# Patient Record
Sex: MALE | Race: OTHER | Employment: OTHER | URBAN - METROPOLITAN AREA
[De-identification: names, ages, dates, MRNs, and addresses within clinical notes are randomized per-mention and may not be internally consistent; named-entity substitution may affect disease eponyms.]

---

## 2019-04-07 ENCOUNTER — HOSPITAL ENCOUNTER (INPATIENT)
Facility: HOSPITAL | Age: 84
LOS: 1 days | Discharge: HOME | DRG: 202 | End: 2019-04-09
Attending: EMERGENCY MEDICINE | Admitting: HOSPITALIST
Payer: COMMERCIAL

## 2019-04-07 ENCOUNTER — APPOINTMENT (OUTPATIENT)
Dept: GENERAL RADIOLOGY | Facility: HOSPITAL | Age: 84
DRG: 202 | End: 2019-04-07
Attending: EMERGENCY MEDICINE
Payer: COMMERCIAL

## 2019-04-07 DIAGNOSIS — E86.0 DEHYDRATION: Primary | ICD-10-CM

## 2019-04-07 DIAGNOSIS — R19.7 DIARRHEA OF PRESUMED INFECTIOUS ORIGIN: ICD-10-CM

## 2019-04-07 DIAGNOSIS — B34.9 VIRAL SYNDROME: ICD-10-CM

## 2019-04-07 PROBLEM — R73.9 HYPERGLYCEMIA: Status: ACTIVE | Noted: 2019-04-07

## 2019-04-07 PROCEDURE — 99223 1ST HOSP IP/OBS HIGH 75: CPT | Performed by: HOSPITALIST

## 2019-04-07 PROCEDURE — 71046 X-RAY EXAM CHEST 2 VIEWS: CPT | Performed by: EMERGENCY MEDICINE

## 2019-04-07 RX ORDER — PANTOPRAZOLE SODIUM 20 MG/1
20 TABLET, DELAYED RELEASE ORAL
Status: DISCONTINUED | OUTPATIENT
Start: 2019-04-08 | End: 2019-04-09

## 2019-04-07 RX ORDER — DEXTROSE MONOHYDRATE 25 G/50ML
50 INJECTION, SOLUTION INTRAVENOUS
Status: DISCONTINUED | OUTPATIENT
Start: 2019-04-07 | End: 2019-04-09

## 2019-04-07 RX ORDER — BICALUTAMIDE 50 MG/1
50 TABLET, FILM COATED ORAL 3 TIMES DAILY
COMMUNITY

## 2019-04-07 RX ORDER — ALENDRONATE SODIUM 70 MG/1
70 TABLET ORAL WEEKLY
COMMUNITY

## 2019-04-07 RX ORDER — OMEPRAZOLE 20 MG/1
20 CAPSULE, DELAYED RELEASE ORAL
COMMUNITY

## 2019-04-07 RX ORDER — ACETAMINOPHEN 325 MG/1
650 TABLET ORAL EVERY 6 HOURS PRN
Status: DISCONTINUED | OUTPATIENT
Start: 2019-04-07 | End: 2019-04-09

## 2019-04-07 RX ORDER — DEXTROSE AND SODIUM CHLORIDE 5; .45 G/100ML; G/100ML
INJECTION, SOLUTION INTRAVENOUS CONTINUOUS
Status: ACTIVE | OUTPATIENT
Start: 2019-04-07 | End: 2019-04-07

## 2019-04-07 RX ORDER — BICALUTAMIDE 50 MG/1
50 TABLET, FILM COATED ORAL 3 TIMES DAILY
Status: DISCONTINUED | OUTPATIENT
Start: 2019-04-07 | End: 2019-04-09

## 2019-04-07 RX ORDER — GUAIFENESIN 600 MG
600 TABLET, EXTENDED RELEASE 12 HR ORAL 2 TIMES DAILY
Status: DISCONTINUED | OUTPATIENT
Start: 2019-04-07 | End: 2019-04-09

## 2019-04-07 RX ORDER — IBANDRONATE SODIUM 3 MG/3 ML
3 SYRINGE (ML) INTRAVENOUS ONCE
COMMUNITY

## 2019-04-07 RX ORDER — SODIUM CHLORIDE 9 MG/ML
INJECTION, SOLUTION INTRAVENOUS ONCE
Status: COMPLETED | OUTPATIENT
Start: 2019-04-07 | End: 2019-04-07

## 2019-04-07 RX ORDER — ALBUTEROL SULFATE 2.5 MG/3ML
2.5 SOLUTION RESPIRATORY (INHALATION) EVERY 4 HOURS PRN
Status: DISCONTINUED | OUTPATIENT
Start: 2019-04-07 | End: 2019-04-09

## 2019-04-07 RX ORDER — ONDANSETRON 2 MG/ML
4 INJECTION INTRAMUSCULAR; INTRAVENOUS EVERY 6 HOURS PRN
Status: DISCONTINUED | OUTPATIENT
Start: 2019-04-07 | End: 2019-04-09

## 2019-04-07 RX ORDER — METOCLOPRAMIDE HYDROCHLORIDE 5 MG/ML
5 INJECTION INTRAMUSCULAR; INTRAVENOUS EVERY 8 HOURS PRN
Status: DISCONTINUED | OUTPATIENT
Start: 2019-04-07 | End: 2019-04-09

## 2019-04-07 NOTE — ED NOTES
Pt resting comfortably denies pain . Warm blankets intact. NAD resps easy nonlabored.  No current diarrhea

## 2019-04-07 NOTE — PLAN OF CARE
Problem: Patient/Family Goals  Goal: Patient/Family Long Term Goal  Description  Patient's Long Term Goal:     Interventions:  -  - See additional Care Plan goals for specific interventions  Outcome: Progressing  Goal: Patient/Family Short Term Goal  Tony algorithm/standards of care as needed  Outcome: Progressing     Problem: Patient/Family Goals  Goal: Patient/Family Short Term Goal  Description  Patient's Short Term    Interventions:   -   - See additional Care Plan goals for specific interventions  Outc hyperglcemia,dehydration,diarrhea,had productive x 2 weeks,has poor appetite,made comfortable in bed,started on Iv fluids at reg rates,on droplet isolation to r/o flu.

## 2019-04-07 NOTE — ED PROVIDER NOTES
Patient Seen in: [facility] Emergency Department    History   Patient presents with:  Nausea/Vomiting/Diarrhea (gastrointestinal)  Cough/URI    Stated Complaint: diarrhea, cough    HPI    Patient presents with cough and weakness which has recently w Mouth/Throat: Oropharynx is clear and moist.   Eyes: EOM are normal.   Neck: Normal range of motion. Neck supple. Cardiovascular: Normal rate, regular rhythm, normal heart sounds and intact distal pulses. No murmur heard.   Pulmonary/Chest: Effort nor RAINBOW DRAW BLUE   RAINBOW DRAW LAVENDER   RAINBOW DRAW LIGHT GREEN   RAINBOW DRAW GOLD   BLOOD CULTURE   URINE CULTURE, ROUTINE   SPUTUM CULTURE   C. DIFFICILE(TOXIGENIC)PCR   OCCULT BLOOD, STOOL   STOOL CULTURE W/SHIGATOXIN    Narrative:      The follo with continued IV rehydration.   Admission disposition: 4/7/2019  1:30 PM                 Disposition and Plan     Clinical Impression:  Dehydration  (primary encounter diagnosis)  Viral syndrome  Diarrhea of presumed infectious origin    Disposition:  Admi

## 2019-04-07 NOTE — ED NOTES
Pt sats dropped on monitor to 87% on room air with a good waveform.  Onel Ibanez RN placed pt on 2L O2 and sats rised to 97%

## 2019-04-07 NOTE — CONSULTS
[facility] Note:  Renal Dose Adjustment for Metoclopramide (REGLAN)    Esperanza Lima has been prescribed Metoclopramide (REGLAN) 10 mg every 8 hours as needed for n/v.    Estimated Creatinine Clearance: 32.1 mL/min (A) (based on SCr of 1.4 mg/dL (H)).

## 2019-04-07 NOTE — ED INITIAL ASSESSMENT (HPI)
Pt here with family whom stating pt has had a viral cough for the last 2wks. Over the weekend pt developed increased cough decreased appetite and generalized weakness with chills.  Temp 100

## 2019-04-07 NOTE — H&P
[facility]PITAL[facility]  History and Physical     Donivan Sos Patient Status:  Observation    1935 MRN [id number]   [facility] 4NW-A Attending Artis Cuevas MD   Hosp Day # 0 PCP No primary care provider on file.      Chief Complain OR TABS 1 TABLET EVERY 4 TO 6 HOURS AS NEEDED Disp:  Rfl:        Review of Systems:   A comprehensive 14 point review of systems was completed. Pertinent positives and negatives noted in the HPI.     Physical Exam:    /77   Pulse 91   Temp 100 °F ( MD  4/7/2019

## 2019-04-08 PROCEDURE — 99232 SBSQ HOSP IP/OBS MODERATE 35: CPT | Performed by: HOSPITALIST

## 2019-04-08 RX ORDER — POTASSIUM CHLORIDE 20 MEQ/1
40 TABLET, EXTENDED RELEASE ORAL EVERY 4 HOURS
Status: COMPLETED | OUTPATIENT
Start: 2019-04-08 | End: 2019-04-08

## 2019-04-08 NOTE — PROGRESS NOTES
[facility]  Progress Note     Karl Sheron Patient Status:  Inpatient    1935 MRN [id number]   [facility] 4NW-A Attending Lyndsay Cloud MD   Hosp Day # 0 PCP No primary care provider on file.      Chief Complaint: bronchit better today but had a fever. Will keep today and DC vinicius if afebrile x 24 hrs.     Quality:  · DVT Prophylaxis: ambulation  · CODE status: full  · Barron: no  · Central line: no    Estimated date of discharge: TBD  Discharge is dependent on: clinical stabil

## 2019-04-08 NOTE — CM/SW NOTE
04/08/19 0900   CM/SW Referral Data   Referral Source Social Work (self-referral)   Reason for Referral Discharge planning   Informant Patient   Patient Info   Patient's Mental Status Alert;Oriented   Patient's [address]   Number of Levels

## 2019-04-08 NOTE — PLAN OF CARE
AAOx4,with occasional non-productive cough. Denies SOB. Incontinent of urine,kept clean and dry. Denies pain. IVF continued.

## 2019-04-08 NOTE — PLAN OF CARE
Problem: Patient/Family Goals  Goal: Patient/Family Long Term Goal  Description  Patient's Long Term Goal:    Interventions:  Outcome: Progressing  Goal: Patient/Family Short Term Goal  Description  Patient's Short Term Goal:     Interventions:   - See a pharmacy to review patient's medication profile  Outcome: Progressing  Goal: Maintains adequate nutritional intake (undernourished)  Description  INTERVENTIONS:  - Monitor percentage of each meal consumed  - Identify factors contributing to decreased intak injury  - Provide assistive devices as appropriate  - Consider OT/PT consult to assist with strengthening/mobility  - Encourage toileting schedule  Outcome: Progressing     Problem: METABOLIC/FLUID AND ELECTROLYTES - ADULT  Goal: Electrolytes maintained wi

## 2019-04-08 NOTE — PROGRESS NOTES
Assumed plan of care of patient at 2300 last night. Patient sleeping and comfortable. IVF infusing. Will continue to monitor.

## 2019-04-08 NOTE — PHYSICAL THERAPY NOTE
PHYSICAL THERAPY QUICK EVALUATION - INPATIENT    Room Number: 405/405-A  Evaluation Date: 4/8/2019  Presenting Problem: Dehydration, Viral Syndrome, Diarrhea, Hyperglycemia  Physician Order: PT Eval and Treat    Pt is 80year old male admitted on 4/7/201 Restriction: None                PAIN ASSESSMENT  Ratin         RANGE OF MOTION AND STRENGTH ASSESSMENT  Upper extremity ROM and strength are within functional limits     Lower extremity ROM is within functional limits     Lower extremity strength is w eyes closed                                4  3. Reaching forward with outstretched arm while standing       4  4.  object from the floor from a standing position           2  5. Turning to look over left and right shoulders while standing   4  6.  Mary Elia GOALS  Patient was able to achieve the following goals . ..     Patient was able to transfer Safely and independently   Patient able to ambulate on level surfaces Safely and independently

## 2019-04-09 VITALS
HEART RATE: 96 BPM | DIASTOLIC BLOOD PRESSURE: 54 MMHG | OXYGEN SATURATION: 96 % | HEIGHT: 64 IN | WEIGHT: 104.81 LBS | BODY MASS INDEX: 17.89 KG/M2 | SYSTOLIC BLOOD PRESSURE: 120 MMHG | TEMPERATURE: 99 F | RESPIRATION RATE: 18 BRPM

## 2019-04-09 PROCEDURE — 99239 HOSP IP/OBS DSCHRG MGMT >30: CPT | Performed by: HOSPITALIST

## 2019-04-09 RX ORDER — ALBUTEROL SULFATE 90 UG/1
1 AEROSOL, METERED RESPIRATORY (INHALATION) EVERY 6 HOURS PRN
Qty: 1 INHALER | Refills: 0 | Status: SHIPPED | OUTPATIENT
Start: 2019-04-09

## 2019-04-09 NOTE — PLAN OF CARE
Problem: RISK FOR INFECTION - ADULT  Goal: Absence of fever/infection during anticipated neutropenic period  Description  INTERVENTIONS  - Monitor WBC  - Administer growth factors as ordered  - Implement neutropenic guidelines  Outcome: Progressing     P

## 2019-04-09 NOTE — PROGRESS NOTES
NURSING DISCHARGE NOTE    Discharged Home via Ambulatory. per walker accompanied per staff and son  Accompanied by Family member  Belongings Taken by patient/family.

## 2019-04-09 NOTE — DIETARY MALNUTRITION NOTE
[facility]    NUTRITION INITIAL ASSESSMENT    Pt meets chronic moderate malnutrition criteria.     CRITERIA FOR MALNUTRITION DIAGNOSIS:  Criteria for non-severe malnutrition diagnosis: chronic illness related to energy intake less than75% for greater t oz)      NUTRITION:  Diet: general  Oral Supplements: ensure HP    FOOD/NUTRITION RELATED HISTORY:  Appetite: Fair and Good  Intake: <75%  Intake Meeting Needs: Marginal, added supplements  Food Allergies: No  Cultural/Ethnic/Orthodox Preferences Addresse

## 2019-04-11 NOTE — DISCHARGE SUMMARY
[facility]OSPITAL[facility]  DISCHARGE SUMMARY     Alysa Springer Patient Status:  Inpatient    1935 MRN [id number]   [facility] 4NW-A Attending No att. providers found   [facility] Day # 1 PCP No primary care provider on file.      Date of Admissio details   alendronate 70 MG Tabs  Commonly known as:  FOSAMAX      Take 70 mg by mouth once a week. Refills:  0     bicalutamide 50 MG Tabs  Commonly known as:  CASODEX      Take 50 mg by mouth 3 (three) times daily.    Refills:  0     Ibandronate Sodium

## 2019-04-16 NOTE — PAYOR COMM NOTE
--------------  ADMISSION REVIEW     Payor: Roselia Subramanian #:  [id number]  Authorization Number: [id number]    Admit date: 4/8/19  Admit time: 56       Admitting Physician: Pernell Castleman, MD  Attending Physician:  No att. providers found  Primary Ca src 04/07/19 1051 Temporal   SpO2 04/07/19 1045 94 %   O2 Device 04/07/19 1051 None (Room air)     Current:/87   Pulse 103   Temp 100 °F (37.8 °C) (Temporal)   Resp 22   Ht 162.6 cm (5' 4\")   Wt 56.7 kg   SpO2 97%   BMI 21.46 kg/m²      Physical Exa SPUTUM CULTURE   C. DIFFICILE(TOXIGENIC)PCR   OCCULT BLOOD, STOOL   STOOL CULTURE W/SHIGATOXIN   STOOL CULTURE(P)   SHIGATOXIN     ED Course as of Apr 07 1340  ------------------------------------------------------------  Time: 04/07 1148  Value: WBC: 9. 2019  4:22 PM Status:  Signed    :  Piyush Waldrop MD (Physician)           [facility]  [facility] and Physical     Amelia Oliva Patient Status:  Observation    1935 MRN [id number]   [facility] 4NW-A Attending Quintin Edwards Temp 100 °F (37.8 °C) (Temporal)   Resp (!) 27   Ht 5' 4\" (1.626 m)   Wt 125 lb (56.7 kg)   SpO2 99%   BMI 21.46 kg/m²    General: No acute distress. Alert and oriented x 3. HEENT: Normocephalic atraumatic. Moist mucous membranes. EOM-I. PERRLA.  Anicte 1704-D/C'd         guaiFENesin ER (MUCINEX) 12 hr tab 600 mg   Dose: 600 mg  Freq: 2 times daily Route: OR  Start: 04/07/19 2100 End: 04/09/19 1704 2104-Given        0841-Given   2030-Given      0848-Given   1704-D/C'd       Insulin Aspart Pen (NOVOLO

## 2019-04-16 NOTE — PAYOR COMM NOTE
--------------  CONTINUED STAY REVIEW    Uriel Muñoz #:  [id number]  Authorization Number: PENDING    Admit date: 4/8/19  Admit time: 56    Admitting Physician: Piyush Waldrop MD  Attending Physician:  No att. providers found  Primary C Route:  SC  Start: 04/07/19 1700 End: 04/07/19 1628    1628-D/C'd           Pantoprazole Sodium (PROTONIX) EC tab 20 mg   Dose: 20 mg  Freq: Every morning before breakfast Route: OR  Start: 04/08/19 0700 End: 04/09/19 1704     0505-Given        (0700)-Not G

## 2019-05-14 NOTE — PAYOR COMM NOTE
--------------  DISCHARGE REVIEW    Amanuel Bueno #:  [id number]  Authorization Number: PENDING    Admit date: 4/8/19  Admit time:  9847  Discharge Date: 4/9/2019 11:30 AM     Admitting Physician: Hever Durbin MD  Attending Physician:  Sharda eventually resolved. His symptoms significantly improved. He was eventually stable for discharge home. Lace+ Score: 47  59-90 High Risk  29-58 Medium Risk  0-28   Low Risk. TCM Follow-Up Recommendation:  LACE 29-58:  Moderate Risk of readmission aft spent:  > 30 minutes        Electronically signed by Cortez Tuttle MD on 4/11/2019  1:46 PM         REVIEWER COMMENTS